# Patient Record
Sex: FEMALE | Race: WHITE | URBAN - METROPOLITAN AREA
[De-identification: names, ages, dates, MRNs, and addresses within clinical notes are randomized per-mention and may not be internally consistent; named-entity substitution may affect disease eponyms.]

---

## 2023-11-29 RX ORDER — VIT C/B6/B5/MAGNESIUM/HERB 173 50-5-6-5MG
1 CAPSULE ORAL DAILY
COMMUNITY

## 2023-11-29 RX ORDER — GARLIC EXTRACT 500 MG
1 CAPSULE ORAL DAILY
COMMUNITY

## 2023-11-29 RX ORDER — CHOLECALCIFEROL (VITAMIN D3) 50 MCG
1 TABLET ORAL DAILY
COMMUNITY

## 2023-11-29 RX ORDER — OMEPRAZOLE 40 MG/1
40 CAPSULE, DELAYED RELEASE ORAL
COMMUNITY

## 2023-11-29 RX ORDER — CHLORAL HYDRATE 500 MG
1 CAPSULE ORAL DAILY
COMMUNITY

## 2023-11-29 RX ORDER — URSODIOL 500 MG/1
500 TABLET, FILM COATED ORAL 2 TIMES DAILY
COMMUNITY

## 2023-12-26 ENCOUNTER — ANESTHESIA EVENT (OUTPATIENT)
Dept: SURGERY | Facility: HOSPITAL | Age: 53
End: 2023-12-26
Payer: COMMERCIAL

## 2023-12-27 ENCOUNTER — HOSPITAL ENCOUNTER (OUTPATIENT)
Facility: HOSPITAL | Age: 53
Discharge: HOME | End: 2023-12-28
Attending: SURGERY | Admitting: SURGERY
Payer: COMMERCIAL

## 2023-12-27 ENCOUNTER — ANESTHESIA (OUTPATIENT)
Dept: SURGERY | Facility: HOSPITAL | Age: 53
End: 2023-12-27
Payer: COMMERCIAL

## 2023-12-27 DIAGNOSIS — Z01.818 PRE-OP TESTING: Primary | ICD-10-CM

## 2023-12-27 LAB — B-HCG UR QL: NEGATIVE

## 2023-12-27 PROCEDURE — 8E0W4CZ ROBOTIC ASSISTED PROCEDURE OF TRUNK REGION, PERCUTANEOUS ENDOSCOPIC APPROACH: ICD-10-PCS | Performed by: SURGERY

## 2023-12-27 PROCEDURE — 81025 URINE PREGNANCY TEST: CPT

## 2023-12-27 PROCEDURE — 0BQT4ZZ REPAIR DIAPHRAGM, PERCUTANEOUS ENDOSCOPIC APPROACH: ICD-10-PCS | Performed by: SURGERY

## 2023-12-27 RX ORDER — MIDAZOLAM HYDROCHLORIDE 1 MG/ML
1 INJECTION INTRAMUSCULAR; INTRAVENOUS EVERY 5 MIN PRN
Status: DISCONTINUED | OUTPATIENT
Start: 2023-12-27 | End: 2023-12-27 | Stop reason: HOSPADM

## 2023-12-27 RX ORDER — SCOLOPAMINE TRANSDERMAL SYSTEM 1 MG/1
1 PATCH, EXTENDED RELEASE TRANSDERMAL ONCE
Status: DISCONTINUED | OUTPATIENT
Start: 2023-12-27 | End: 2023-12-27 | Stop reason: HOSPADM

## 2023-12-27 RX ORDER — LIDOCAINE HYDROCHLORIDE ANHYDROUS AND DEXTROSE MONOHYDRATE .8; 5 G/100ML; G/100ML
INJECTION, SOLUTION INTRAVENOUS CONTINUOUS PRN
Status: DISCONTINUED | OUTPATIENT
Start: 2023-12-27 | End: 2023-12-27 | Stop reason: SURG

## 2023-12-27 RX ORDER — SODIUM CHLORIDE, SODIUM LACTATE, POTASSIUM CHLORIDE, CALCIUM CHLORIDE 600; 310; 30; 20 MG/100ML; MG/100ML; MG/100ML; MG/100ML
INJECTION, SOLUTION INTRAVENOUS CONTINUOUS
Status: DISCONTINUED | OUTPATIENT
Start: 2023-12-27 | End: 2023-12-27 | Stop reason: HOSPADM

## 2023-12-27 RX ORDER — LIDOCAINE HYDROCHLORIDE 10 MG/ML
INJECTION, SOLUTION EPIDURAL; INFILTRATION; INTRACAUDAL; PERINEURAL AS NEEDED
Status: DISCONTINUED | OUTPATIENT
Start: 2023-12-27 | End: 2023-12-27 | Stop reason: SURG

## 2023-12-27 RX ORDER — LIDOCAINE HYDROCHLORIDE 10 MG/ML
INJECTION, SOLUTION INFILTRATION; PERINEURAL AS NEEDED
Status: DISCONTINUED | OUTPATIENT
Start: 2023-12-27 | End: 2023-12-27 | Stop reason: HOSPADM

## 2023-12-27 RX ORDER — PANTOPRAZOLE SODIUM 40 MG/1
40 TABLET, DELAYED RELEASE ORAL
Status: DISCONTINUED | OUTPATIENT
Start: 2023-12-28 | End: 2023-12-28

## 2023-12-27 RX ORDER — SODIUM CHLORIDE, SODIUM LACTATE, POTASSIUM CHLORIDE, CALCIUM CHLORIDE 600; 310; 30; 20 MG/100ML; MG/100ML; MG/100ML; MG/100ML
INJECTION, SOLUTION INTRAVENOUS CONTINUOUS
Status: DISCONTINUED | OUTPATIENT
Start: 2023-12-27 | End: 2023-12-28

## 2023-12-27 RX ORDER — DEXAMETHASONE SODIUM PHOSPHATE 4 MG/ML
VIAL (ML) INJECTION AS NEEDED
Status: DISCONTINUED | OUTPATIENT
Start: 2023-12-27 | End: 2023-12-27 | Stop reason: SURG

## 2023-12-27 RX ORDER — LABETALOL HYDROCHLORIDE 5 MG/ML
20 INJECTION, SOLUTION INTRAVENOUS EVERY 6 HOURS PRN
Status: DISCONTINUED | OUTPATIENT
Start: 2023-12-27 | End: 2023-12-28

## 2023-12-27 RX ORDER — LABETALOL HYDROCHLORIDE 5 MG/ML
5 INJECTION, SOLUTION INTRAVENOUS EVERY 5 MIN PRN
Status: DISCONTINUED | OUTPATIENT
Start: 2023-12-27 | End: 2023-12-27 | Stop reason: HOSPADM

## 2023-12-27 RX ORDER — URSODIOL 300 MG/1
600 CAPSULE ORAL 2 TIMES DAILY
Status: DISCONTINUED | OUTPATIENT
Start: 2023-12-27 | End: 2023-12-28

## 2023-12-27 RX ORDER — POLYETHYLENE GLYCOL 3350 17 G/17G
17 POWDER, FOR SOLUTION ORAL DAILY PRN
Status: DISCONTINUED | OUTPATIENT
Start: 2023-12-27 | End: 2023-12-28

## 2023-12-27 RX ORDER — ACETAMINOPHEN 500 MG
1000 TABLET ORAL ONCE
Status: DISCONTINUED | OUTPATIENT
Start: 2023-12-27 | End: 2023-12-27 | Stop reason: HOSPADM

## 2023-12-27 RX ORDER — BISACODYL 10 MG
10 SUPPOSITORY, RECTAL RECTAL
Status: DISCONTINUED | OUTPATIENT
Start: 2023-12-27 | End: 2023-12-28

## 2023-12-27 RX ORDER — CEFAZOLIN SODIUM/WATER 2 G/20 ML
SYRINGE (ML) INTRAVENOUS
Status: DISPENSED
Start: 2023-12-27 | End: 2023-12-27

## 2023-12-27 RX ORDER — SENNOSIDES 8.6 MG
17.2 TABLET ORAL NIGHTLY PRN
Status: DISCONTINUED | OUTPATIENT
Start: 2023-12-27 | End: 2023-12-28

## 2023-12-27 RX ORDER — KETAMINE HYDROCHLORIDE 50 MG/ML
INJECTION, SOLUTION INTRAMUSCULAR; INTRAVENOUS AS NEEDED
Status: DISCONTINUED | OUTPATIENT
Start: 2023-12-27 | End: 2023-12-27 | Stop reason: SURG

## 2023-12-27 RX ORDER — BUPIVACAINE HYDROCHLORIDE 5 MG/ML
INJECTION, SOLUTION EPIDURAL; INTRACAUDAL AS NEEDED
Status: DISCONTINUED | OUTPATIENT
Start: 2023-12-27 | End: 2023-12-27 | Stop reason: HOSPADM

## 2023-12-27 RX ORDER — PROCHLORPERAZINE EDISYLATE 5 MG/ML
5 INJECTION INTRAMUSCULAR; INTRAVENOUS EVERY 8 HOURS PRN
Status: DISCONTINUED | OUTPATIENT
Start: 2023-12-27 | End: 2023-12-28

## 2023-12-27 RX ORDER — MELATONIN
3 NIGHTLY PRN
Status: DISCONTINUED | OUTPATIENT
Start: 2023-12-27 | End: 2023-12-28

## 2023-12-27 RX ORDER — TRAMADOL HYDROCHLORIDE 50 MG/1
50 TABLET ORAL EVERY 6 HOURS SCHEDULED
Status: DISCONTINUED | OUTPATIENT
Start: 2023-12-27 | End: 2023-12-28

## 2023-12-27 RX ORDER — MAGNESIUM HYDROXIDE/ALUMINUM HYDROXICE/SIMETHICONE 120; 1200; 1200 MG/30ML; MG/30ML; MG/30ML
30 SUSPENSION ORAL ONCE
Status: COMPLETED | OUTPATIENT
Start: 2023-12-27 | End: 2023-12-27

## 2023-12-27 RX ORDER — DEXTROSE MONOHYDRATE, SODIUM CHLORIDE, AND POTASSIUM CHLORIDE 50; 1.49; 4.5 G/1000ML; G/1000ML; G/1000ML
INJECTION, SOLUTION INTRAVENOUS CONTINUOUS
Status: DISCONTINUED | OUTPATIENT
Start: 2023-12-27 | End: 2023-12-28

## 2023-12-27 RX ORDER — PROCHLORPERAZINE EDISYLATE 5 MG/ML
5 INJECTION INTRAMUSCULAR; INTRAVENOUS EVERY 8 HOURS PRN
Status: DISCONTINUED | OUTPATIENT
Start: 2023-12-27 | End: 2023-12-27 | Stop reason: HOSPADM

## 2023-12-27 RX ORDER — ONDANSETRON 2 MG/ML
4 INJECTION INTRAMUSCULAR; INTRAVENOUS EVERY 6 HOURS PRN
Status: DISCONTINUED | OUTPATIENT
Start: 2023-12-27 | End: 2023-12-27 | Stop reason: HOSPADM

## 2023-12-27 RX ORDER — MEPERIDINE HYDROCHLORIDE 25 MG/ML
12.5 INJECTION INTRAMUSCULAR; INTRAVENOUS; SUBCUTANEOUS AS NEEDED
Status: DISCONTINUED | OUTPATIENT
Start: 2023-12-27 | End: 2023-12-27 | Stop reason: HOSPADM

## 2023-12-27 RX ORDER — ONDANSETRON 2 MG/ML
4 INJECTION INTRAMUSCULAR; INTRAVENOUS EVERY 6 HOURS PRN
Status: DISCONTINUED | OUTPATIENT
Start: 2023-12-27 | End: 2023-12-28

## 2023-12-27 RX ORDER — ENEMA 19; 7 G/133ML; G/133ML
1 ENEMA RECTAL ONCE AS NEEDED
Status: DISCONTINUED | OUTPATIENT
Start: 2023-12-27 | End: 2023-12-28

## 2023-12-27 RX ORDER — KETOROLAC TROMETHAMINE 30 MG/ML
30 INJECTION, SOLUTION INTRAMUSCULAR; INTRAVENOUS EVERY 6 HOURS
Status: DISCONTINUED | OUTPATIENT
Start: 2023-12-27 | End: 2023-12-28

## 2023-12-27 RX ORDER — ROCURONIUM BROMIDE 10 MG/ML
INJECTION, SOLUTION INTRAVENOUS AS NEEDED
Status: DISCONTINUED | OUTPATIENT
Start: 2023-12-27 | End: 2023-12-27 | Stop reason: SURG

## 2023-12-27 RX ORDER — NALOXONE HYDROCHLORIDE 0.4 MG/ML
80 INJECTION, SOLUTION INTRAMUSCULAR; INTRAVENOUS; SUBCUTANEOUS AS NEEDED
Status: DISCONTINUED | OUTPATIENT
Start: 2023-12-27 | End: 2023-12-27 | Stop reason: HOSPADM

## 2023-12-27 RX ORDER — CEFAZOLIN SODIUM/WATER 2 G/20 ML
2 SYRINGE (ML) INTRAVENOUS ONCE
Status: COMPLETED | OUTPATIENT
Start: 2023-12-27 | End: 2023-12-27

## 2023-12-27 RX ADMIN — ROCURONIUM BROMIDE 50 MG: 10 INJECTION, SOLUTION INTRAVENOUS at 10:44:00

## 2023-12-27 RX ADMIN — LIDOCAINE HYDROCHLORIDE 140 MG: 10 INJECTION, SOLUTION EPIDURAL; INFILTRATION; INTRACAUDAL; PERINEURAL at 10:44:00

## 2023-12-27 RX ADMIN — CEFAZOLIN SODIUM/WATER 2 G: 2 G/20 ML SYRINGE (ML) INTRAVENOUS at 10:50:00

## 2023-12-27 RX ADMIN — DEXAMETHASONE SODIUM PHOSPHATE 8 MG: 4 MG/ML VIAL (ML) INJECTION at 10:44:00

## 2023-12-27 RX ADMIN — SODIUM CHLORIDE, SODIUM LACTATE, POTASSIUM CHLORIDE, CALCIUM CHLORIDE: 600; 310; 30; 20 INJECTION, SOLUTION INTRAVENOUS at 12:10:00

## 2023-12-27 RX ADMIN — KETAMINE HYDROCHLORIDE 25 MG: 50 INJECTION, SOLUTION INTRAMUSCULAR; INTRAVENOUS at 10:44:00

## 2023-12-27 RX ADMIN — LIDOCAINE HYDROCHLORIDE ANHYDROUS AND DEXTROSE MONOHYDRATE 2 MG/KG/HR: .8; 5 INJECTION, SOLUTION INTRAVENOUS at 10:50:00

## 2023-12-27 NOTE — PROGRESS NOTES
Patient A&Ox4. Vital signs stable on room air. Pain 4/10; toradol given. 2/10 upon reassessment. Abdomen soft, nondistended, nontender. Bowel sounds present; active. Patient belching, not passing gas. Denies nausea, vomiting, diarrhea. Lap sites closed with skin glue, open to air, clean, dry, and intact. Clear liquid diet being tolerated well. Patient and significant other updated on plan of care. Questions and concerns discussed.

## 2023-12-27 NOTE — ANESTHESIA PROCEDURE NOTES
Airway  Date/Time: 12/27/2023 10:47 AM  Urgency: elective    Airway not difficult    General Information and Staff    Patient location during procedure: OR  Anesthesiologist: Yasmeen Santizo MD  Performed: anesthesiologist   Performed by: Yasmeen Santizo MD  Authorized by: Yasmeen Santizo MD      Indications and Patient Condition  Indications for airway management: anesthesia  Sedation level: deep  Preoxygenated: yes  Patient position: sniffing  Mask difficulty assessment: 1 - vent by mask    Final Airway Details  Final airway type: endotracheal airway      Successful airway: ETT  Cuffed: yes   Successful intubation technique: direct laryngoscopy  Endotracheal tube insertion site: oral  Blade: Zackary  Blade size: #4  ETT size (mm): 7.0    Cormack-Lehane Classification: grade I - full view of glottis  Placement verified by: capnometry   Measured from: lips  ETT to lips (cm): 21  Number of attempts at approach: 1

## 2023-12-27 NOTE — H&P
H&P Notes  - documented in this encounter  Keara Renae MD - 11/02/2023 11:15 AM CDT  Formatting of this note might be different from the original.  Lily Fung is a 48year old female. Patient referred from No ref. provider found. Patient presents with a history of severe gastroesophageal reflux disease dependent on antireflux medications at maximum dose. Patient underwent an evaluation with a upper endoscopy and Bravo pH study and esophageal manometry which revealed a sliding hiatal hernia hernia with normal function of esophagus she presents today for further evaluation and treatment. History reviewed. No pertinent past medical history. History reviewed. No pertinent surgical history. History reviewed. No pertinent family history. Social History  Tobacco Use  Smoking status: Never  Smokeless tobacco: Never  Alcohol use: Not on file  Drug use: Not on file    ROS:    GENERAL HEALTH: otherwise feels well, no weight loss, no fever or chills  SKIN: denies any unusual skin rashes or jaundice  HEENT: denies nasal congestion, sinus pain or sore throat; hearing loss negative  RESPIRATORY: denies shortness of breath, wheezing or cough  CARDIOVASCULAR: denies chest pain or PEREZ; no palpitations  GI: denies nausea, vomiting, constipation, diarrhea; no rectal bleeding; no heartburn  GENITAL/: no dysuria, urgency or frequency, no tea colored urine  MUSCULOSKELETAL: no joint complaints upper or lower extremities  HEMATOLOGY: denies hx anemia; denies bruising or excessive bleeding    EXAM:    GENERAL: well developed, well nourished female, in no apparent distress  SKIN: anicteric  EYES: PERRLA, EOMI, sclera anicteric  HEENT: normocephalic; normal nose  NECK: supple, no JVD  RESPIRATORY: clear to percussion and auscultation  CARDIOVASCULAR: RRR, murmur negative  ABDOMEN: normal active BS, soft, nondistended; no HSM, no masses. There are no inguinal hernias noted.   LYMPHATIC: no lymphadenopathy  EXTREMITIES: no cyanosis, clubbing or edema    Labs:    Imp: Hill grade 2 sliding hiatal hernia with severe gastroesophageal reflux disease    Rec: I had a long discussion with the patient regarding her symptoms and underlying disease process. I believe she is a good candidate for surgical correction. We discussed the role of robotic assisted paraesophageal hernia repair with possible mesh and concomitant fundoplication. We discussed the difference between a Nissen and Toupet fundoplication and the indication for each. Patient will need a preoperative upper GI to assess esophageal function assess anatomy. Orders were provided. Surgery will be scheduled in the near future at [facility]. Postoperative and perioperative expectations were discussed. All the questions were answered. The risks, benefits, and alternatives were discussed with the patient at length. We discussed bleeding, infection, recurrence, , bowel injury, anesthesia, recovery and return to work/activity, etc. We discussed the expectations after surgery including activity restrictions, weight limitations. My total time spent with this patient on today's visit was 45 minutes. This includes time in preparation, obtaining and reviewing history, performing the examination, counseling and education, ordering test, referring and communicating with other healthcare professionals, documenting clinical information, independently interpreting radiographs and lab work results, coordinating care, and communication of any results that were available at today's visit    Thanks for referring the patient. Elidia Blackwell MD    Electronically signed by Prakash Sharma MD at 11/03/2023 4:46 PM CDT    The above referenced H&P was reviewed by Naeem Kam MD on 12/27/2023, the patient was examined and no significant changes have occurred in the patient's condition since the H&P was performed. Risks and benefits were discussed, proceed with procedure as planned.

## 2023-12-27 NOTE — OPERATIVE REPORT
[facility]                                                         Operative Note    [place] Location: [facility] Perioperative   CSN [id number] MRN [id number]   Admission Date 12/27/2023 Procedure Date 12/27/2023   Attending Physician Prakash Sharma MD Procedure Physician Naeem Kam MD     Pre-Operative Diagnosis: GERD     Post-Operative Diagnosis: GERD    Procedure Performed: ROBOTIC ASSISTED REPAIR OF HIATAL HERNIA WITH NISSEN FUNDOPLICATION, POSSIBLE OPEN    Surgeon: Naeem Kam MD     Assistant(s):  Surgical Assistant.: KEN Rock    The surgical Assistant was necessary for this procedure. The assistant was involved in patient positioning, instrument exchange, improving exposure optimizing visualization of patient's safety, and closure. The duties of the assistant allowed for reduced risk of the performance of this procedure and care of this patient         Anesthesia: General       Complications: none       Estimated Blood Loss: No data recorded            Operative Summary: Patient was taken to the operating room placed in a supine position. They were prepped and draped in usual fashion after being placed under general anesthesia. A Veress needle was inserted above the umbilicus and the abdomen insufflated 15 mm of carbon dioxide. This was exchanged with a 8 mm trocar after incision was made. 4 additional 8 mm trochars were placed and a 5 mm trocar. Camera was inserted and the abdominal cavity was surveyed prior to additional trocar placement. Patient was placed in a steep Trendelenburg position. Snake retractor was placed through the 5 mm trocar and the liver was retracted open to the right to expose the GE junction. The excised da Hallie robot was brought into position and docked. Instrumentation was placed. Stomach was gently retracted downward exposing the anterior aspect of the abdominal wall zuleika.   The hernia sac was incised in this location and dissection was bluntly carried down creating the space between the hernia sac and the anterior aspect of the distal esophagus. The sac dissection was extended laterally to the left and right abdominal wall james. Hernia sac was dissected free of the underlying surfaces of the stomach and esophageal body. This was followed by creating a window along the lesser curve coming through the gastrohepatic ligament to expose the right abdominal wall james. Hernia sac was dissected free of this james which allowed dissection into the posterior aspect of the gastroesophageal junction. Blunt dissection lifted away the distal esophagus from the aorta. The posterior vagus nerve was appreciated and kept out of harm's way as well as the anterior vagus nerve. This was followed by taking down the short gastrics along the greater curvature and carrying this dissection to the left side of the james. Posterior window was completed allowing for passage of the Penrose drain around the distal esophagus for retraction. Hernia sac was dissected completely away from the GE junction and stomach exposing the esophageal surface allowing dissection and mobilization of the distal esophagus to at least 3 cm outside of the chest.  Once this was completed the right and left james were clearly appreciated. James was then reapproximated with interrupted 0 Ethibond sutures secured down over pledgets. This occurred both posterior and anterior to the esophagus to but up against the esophagus. Once this was accomplished a 54 bougie catheter was gently placed down into the stomach followed by bringing the fundus of the stomach posterior to the GE junction in preparation for a Nissen fundoplication. Several 0 Ethibond sutures were secured creating this floppy wrap over this bougie. The bougie was removed. At this time hemostasis achieved. Snake was removed. Trochars were removed and wounds were closed with absorbable suture. Sterile dressings were applied.   The patient was awoken taken recovery in satisfactory condition          Izaiah Yousif MD  12/27/2023  11:55 AM

## 2023-12-27 NOTE — ANESTHESIA PROCEDURE NOTES
Peripheral IV  Date/Time: 12/27/2023 10:50 AM  Inserted by: Antonia Ramsey MD    Placement  Needle size: 18 G  Laterality: left  Location: hand  Local anesthetic: none  Site prep: alcohol  Technique: anatomical landmarks  Attempts: 1

## 2023-12-28 VITALS
DIASTOLIC BLOOD PRESSURE: 93 MMHG | WEIGHT: 154.38 LBS | HEART RATE: 73 BPM | HEIGHT: 62 IN | OXYGEN SATURATION: 94 % | BODY MASS INDEX: 28.41 KG/M2 | SYSTOLIC BLOOD PRESSURE: 153 MMHG | TEMPERATURE: 98 F | RESPIRATION RATE: 18 BRPM

## 2023-12-28 RX ORDER — TRAMADOL HYDROCHLORIDE 50 MG/1
TABLET ORAL EVERY 6 HOURS PRN
Qty: 20 TABLET | Refills: 0 | Status: SHIPPED | OUTPATIENT
Start: 2023-12-28

## 2023-12-28 NOTE — DISCHARGE INSTRUCTIONS
Diet after repair of Hiatus Hernia, Paraesophageal hernia with fundoplication    Dietary Advice: Following surgery, swallowing may be difficult as a result of swelling around the esophagus (food pipe). It may take a month or more for swallowing to feel normal again with all foods. Four stages of diet are advised. In each stage, when swallowing feels normal, you can move on to the next stage. Most Importantly     -Have small frequent meals and snacks, rather than large meals   -East slowly and chew foods well   -Have moist foods   -If any foods stick, stop eating, relax and allow time for food to clear. Try and drink water to wash the food down: if that fails, try soda water. If foods remains stuck, contact the hospital or your surgeon.      Avoid the following until swallowing is free and easy ( usually 4 weeks):     -Fresh bread   -Rice   -Cake   -hard biscuits   -grilled and fried meat, especially steak, chicken, unless pureed, minced or finely chopped   -aerated drinks ( soft drinks, milkshakes- unless soda water is required to relieve blockage)   -highly spiced foods (avoid for 6 weeks)    Stage 1: 2-5 days    Fluids and semi-fluid items only- these should be smooth and with no lumps     -water, juice, cordial (no soft drinks)   -Mild-plain, flavored (not milkshakes)   -Tea, coffee (not too hot)   -soups (strained or finely pureed)   -ice cream, custard, jelly   -yogurt (plain, vanilla or honey-not with seeds or pieces of fruit)   -potato, pumpkin (finely mashed)   -Gravy, white sauce (no lumps)   -food pureed to a thin consistency ( no lumps)    A  or  is useful    Stage 2: 1-2 weeks    Mashed and very soft foods only- soft lumps able to be mashed with a fork    Add- in:     -Porridge, breakfast cereals such a Weetabix, cornflakes, rice krispies, well softened with mild or hot water   -fruit- fresh fruit (soft, well ripened) stewed or tinned fruit (soft or puree)   -Vegetables - well cooked, soft, mashed or pureed   -Pasta (spaghetti, noodles) well cooked, soft   -Pureed meats, pureed chicken- can be with gravy in a thick soup, or serviced with mashed/pureed vegetables   -Fish: fresh ( take care to remove all bones) or canned tune, salmon (Mashed, no bones)   -Eggs- soft boiled, scrambled, poached    Stage 3: 1-2 weeks    Light foods with more texture: chew well    Add in:   -Tender meats, minced, stews   -Chicken- minced or finely chopped   -Salads   -Toast   -Biscuits   -Alcohol in small quantities if desired    Stage 4: gradual return to normal eating    Gradually add in firm foods. Try the food in the avoid list in small amounts one by one. Chew these foods fairly well. After about 4 weeks, you should be able to eat a full range of foods.  However, you are advised to:     -Continue with small meals and between-meals snacks if need to satisfy your appetite rather than large meals   -Continue to chew all foods well      If you are unable to eat a proper diet after about 4 weeks, please contact your surgeon as your surgeon may wish to see you earlier than planned in the office    Food Suggestions:    Stage 1    Breakfast:     -choose from: glass of mild, smooth yogurt, custard, jelly, tea/coffee, juice     Lunch   -choose from strained soup, finely mashed potato and pumpkin, gravy, white sauce, tomato sauce, jelly, custard, ice cream, cordial, juice    Dinner   -Choose from strained soup, huerta mashed potato, mash carrot, gravy, white sauce, ice cream, jelly, tea, coffee, juice    Stage 2:    Breakfast idea:     -choose from porridge or softened cereal with mil and sugar, soft boiled egg    Lunch idea     -Choose from smooth soup, mashed tuna or salmon with noodles and white sauce, pureed met with mashed or pureed vegetables, pureed or mash fruit    Dinner idea     -Choose from purred Edward-Butler, poached fish fillets with white sauce, mashed potatos, pureed vegetable, pureed or mashed fruit, custard    Stage 3:    Breakfast ideas     -Choose from any of the above, plus toast and spreads, baked beans, cheese and tomato     Lunch ideas     -Choose from any of the above plus soup, tender braised meat and vegetables, fish mornay, canned spaghetti, creamed corn, lentils, legumes(well cooked), cheese, salad, soft fruit (tinned or fresh)    Dinner ideas     -Any of the above, plus pasta and bolognaise sauce, meat casserole, cottage pie, steamed fish, well cooked vegetables, soft, fruit, fresh or tinned    Between meal snack ideas:     -Stage 1: milk (plain or flavored), cordial,juice, smooth yogurt   -Stage 2: soft or mashed fruit, custard   -Stage 3: ripe fresh fruit, cheese, biscuits        Home Care Instructions  LAPAROSCOPIC/ROBOTIC SURGERY      MEDICATIONS   You should anticipate moderate to severe pain for the first few days. Your surgeon has prescribed for you a pain medication. Take the pain  medication as prescribed. You may use ibuprofen( Motrin ) 600 mg 3 times a day with the tramadol or by itself if needed. If you experience severe nausea or vomiting stop the pain medication and call our office. DIET   Your diet should be as reviewed. Eat light foods the first 24 hours. Do not drink alcohol while taking the pain medication    ACTIVITY   You should not drive for the first 3 to 5 days or if you are still requiring prescription pain medication. No lifting greater than 10 to 15 pounds for 3 weeks   Avoid strenuous activity such as running and physical workouts for 3 weeks. Normal daily activities are fine, such as climbing stairs   You may shower after 24 hours. The incisions can get wet but should not be under water in a bath. You may return to work as instructed by your surgeon.     CARE OF SURGICAL SITE   Pain, colorful bruising and slight swelling at the incision sites is usually normal   If you experience fever, chills, inability to urinate, nausea with vomiting, severe diarrhea  or severe, cramping abdominal pain please contact your surgeon    APPOINTMENT   Call the office when you arrive home after surgery and make an appointment to see your surgeon in one week. Should you develop any problems prior to your scheduled appointment, do not hesitate  to call the office.     [email or web address]  [phone]    Please call to set up your 1 week postop office visit    Address:  [address] [place] [address]

## 2023-12-28 NOTE — PROGRESS NOTES
0845:  Patient A&Ox4. Vital signs stable on room air. Pain 5/10; toradol given. 3/10 upon reassessment. Abdomen soft, nondistended, nontender. Bowel sounds present; active. Patient belching and passing gas. Demoes nausea, vomiting, diarrhea. Lap sites closed with skin glue, open to air, clean, dry, and intact. Full liquid diet being tolerated well. Patient updated on plan of care. Questions and concerns discussed. 1530: IV's removed - patient tolerated well. Discharge instructions provided with paper prescription. Questions and concerns discussed.  Patient brought to [facility] entrance

## (undated) DEVICE — AIRSEAL TRI-LUMEN LILTERED TUBE SET: Brand: AIRSEAL

## (undated) DEVICE — INSUFFLATION NEEDLE TO ESTABLISH PNEUMOPERITONEUM.: Brand: INSUFFLATION NEEDLE

## (undated) DEVICE — 2, DISPOSABLE SUCTION/IRRIGATOR WITHOUT DISPOSABLE TIP: Brand: STRYKEFLOW

## (undated) DEVICE — ROBOTIC GENERAL: Brand: MEDLINE INDUSTRIES, INC.

## (undated) DEVICE — 40580 - THE PINK PAD - ADVANCED TRENDELENBURG POSITIONING KIT: Brand: 40580 - THE PINK PAD - ADVANCED TRENDELENBURG POSITIONING KIT

## (undated) DEVICE — SOLUTION IV 1000ML 0.9% NACL PRESERVATIVE

## (undated) DEVICE — STRL PENROSE DRAIN 18" X 1/4": Brand: CARDINAL HEALTH

## (undated) DEVICE — CANNULA SEAL

## (undated) DEVICE — JELLY LUB 2OZ GREASELESS FLIP TOP DISP

## (undated) DEVICE — SUTURE MCRYL SZ 4-0 L18IN ABSRB UD L19MM PS-2

## (undated) DEVICE — ARM DRAPE

## (undated) DEVICE — VESSEL SEALER EXTEND: Brand: ENDOWRIST

## (undated) DEVICE — TIP-UP FENESTRATED GRASPER: Brand: ENDOWRIST

## (undated) DEVICE — BLADELESS OBTURATOR: Brand: WECK VISTA

## (undated) DEVICE — STERILE SYNTHETIC POLYISOPRENE POWDER-FREE SURGICAL GLOVES WITH HYDROGEL COATING, SMOOTH FINISH, STRAIGHT FINGER: Brand: PROTEXIS

## (undated) DEVICE — MEGA SUTURECUT ND: Brand: ENDOWRIST

## (undated) DEVICE — ADHESIVE SKIN TOP FOR WND CLSR DERMBND ADV

## (undated) DEVICE — SUTURE ETHBND EXCEL SZ 0 L36IN NONABSORBABLE X524H

## (undated) DEVICE — TIP COVER ACCESSORY

## (undated) DEVICE — LIGHT HANDLE

## (undated) DEVICE — STANDARD HYPODERMIC NEEDLE,POLYPROPYLENE HUB: Brand: MONOJECT

## (undated) DEVICE — COLUMN DRAPE

## (undated) DEVICE — MONOPOLAR CURVED SCISSORS: Brand: ENDOWRIST

## (undated) DEVICE — AIRSEAL 5 MM ACCESS PORT AND LOW PROFILE OBTURATOR WITH BLADELESS OPTICAL TIP, 120 MM LENGTH: Brand: AIRSEAL

## (undated) DEVICE — LAPAROVUE VISIBILITY SYSTEM LAPAROSCOPIC SOLUTIONS: Brand: LAPAROVUE

## (undated) DEVICE — DISPOSABLE GRASPER: Brand: EPIX LAPAROSCOPIC GRASPER

## (undated) DEVICE — TRAY CATH 16FR F INCL BARDX IC COMPLT CARE

## (undated) DEVICE — FENESTRATED BIPOLAR FORCEPS: Brand: ENDOWRIST

## (undated) NOTE — LETTER
OUTSIDE TESTING RESULT REQUEST     IMPORTANT: FOR YOUR IMMEDIATE ATTENTION  Please FAX all test results listed below to: [phone]       * * * * If testing is NOT complete, arrange with patient A.S.A.P. * * * *      Patient Name: Damir Villa  Surgery Date: 2023  Medical Record: [id number]  CSN: [id number]  : 1970 - A: 48 y     Sex: female  Surgeon(s):  Tamanna Wong MD  Procedure: ROBOTIC ASSISTED REPAIR OF HIATAL HERNIA WITH NISSEN FUNDOPLICATION, POSSIBLE OPEN  Anesthesia Type: General     Surgeon: Tamanna Wong MD     The following Testing and Time Line are REQUIRED PER ANESTHESIA     CBC, Platelet; NO Differential within  30 days  CMP (requires 4 hour fast) within  14 days      Thank You,   Sent by: Kerri Aguilar RN PAT